# Patient Record
Sex: MALE | Race: WHITE | Employment: FULL TIME | URBAN - METROPOLITAN AREA
[De-identification: names, ages, dates, MRNs, and addresses within clinical notes are randomized per-mention and may not be internally consistent; named-entity substitution may affect disease eponyms.]

---

## 2022-12-16 ENCOUNTER — HOSPITAL ENCOUNTER (EMERGENCY)
Age: 22
Discharge: HOME | End: 2022-12-16
Attending: EMERGENCY MEDICINE
Payer: COMMERCIAL

## 2022-12-16 VITALS
TEMPERATURE: 98.7 F | DIASTOLIC BLOOD PRESSURE: 89 MMHG | RESPIRATION RATE: 16 BRPM | HEART RATE: 51 BPM | WEIGHT: 190 LBS | SYSTOLIC BLOOD PRESSURE: 106 MMHG | BODY MASS INDEX: 25.73 KG/M2 | OXYGEN SATURATION: 99 % | HEIGHT: 72 IN

## 2022-12-16 DIAGNOSIS — R19.7 DIARRHEA, UNSPECIFIED TYPE: Primary | ICD-10-CM

## 2022-12-16 DIAGNOSIS — R10.13 ABDOMINAL PAIN, EPIGASTRIC: ICD-10-CM

## 2022-12-16 LAB
ALBUMIN SERPL-MCNC: 4.2 GM/DL (ref 3.4–5)
ALP BLD-CCNC: 71 IU/L (ref 40–129)
ALT SERPL-CCNC: 20 U/L (ref 10–40)
ANION GAP SERPL CALCULATED.3IONS-SCNC: 11 MMOL/L (ref 4–16)
AST SERPL-CCNC: 19 IU/L (ref 15–37)
BASOPHILS ABSOLUTE: 0 K/CU MM
BASOPHILS RELATIVE PERCENT: 0.4 % (ref 0–1)
BILIRUB SERPL-MCNC: 0.4 MG/DL (ref 0–1)
BILIRUBIN URINE: NEGATIVE MG/DL
BLOOD, URINE: NEGATIVE
BUN BLDV-MCNC: 12 MG/DL (ref 6–23)
CALCIUM SERPL-MCNC: 9.5 MG/DL (ref 8.3–10.6)
CHLORIDE BLD-SCNC: 104 MMOL/L (ref 99–110)
CLARITY: CLEAR
CO2: 26 MMOL/L (ref 21–32)
COLOR: YELLOW
COMMENT UA: NORMAL
CREAT SERPL-MCNC: 0.9 MG/DL (ref 0.9–1.3)
DIFFERENTIAL TYPE: ABNORMAL
EOSINOPHILS ABSOLUTE: 0.3 K/CU MM
EOSINOPHILS RELATIVE PERCENT: 4.3 % (ref 0–3)
GFR SERPL CREATININE-BSD FRML MDRD: >60 ML/MIN/1.73M2
GLUCOSE BLD-MCNC: 85 MG/DL (ref 70–99)
GLUCOSE, URINE: NEGATIVE MG/DL
HCT VFR BLD CALC: 45.1 % (ref 42–52)
HEMOGLOBIN: 15.5 GM/DL (ref 13.5–18)
IMMATURE NEUTROPHIL %: 0.4 % (ref 0–0.43)
KETONES, URINE: NEGATIVE MG/DL
LEUKOCYTE ESTERASE, URINE: NEGATIVE
LIPASE: 67 IU/L (ref 13–60)
LYMPHOCYTES ABSOLUTE: 2.5 K/CU MM
LYMPHOCYTES RELATIVE PERCENT: 32.5 % (ref 24–44)
MCH RBC QN AUTO: 30.6 PG (ref 27–31)
MCHC RBC AUTO-ENTMCNC: 34.4 % (ref 32–36)
MCV RBC AUTO: 89 FL (ref 78–100)
MONOCYTES ABSOLUTE: 0.5 K/CU MM
MONOCYTES RELATIVE PERCENT: 6.6 % (ref 0–4)
NITRITE URINE, QUANTITATIVE: NEGATIVE
PDW BLD-RTO: 13 % (ref 11.7–14.9)
PH, URINE: 5 (ref 5–8)
PLATELET # BLD: 177 K/CU MM (ref 140–440)
PMV BLD AUTO: 10.5 FL (ref 7.5–11.1)
POTASSIUM SERPL-SCNC: 4 MMOL/L (ref 3.5–5.1)
PROTEIN UA: NEGATIVE MG/DL
RBC # BLD: 5.07 M/CU MM (ref 4.6–6.2)
SEGMENTED NEUTROPHILS ABSOLUTE COUNT: 4.3 K/CU MM
SEGMENTED NEUTROPHILS RELATIVE PERCENT: 55.8 % (ref 36–66)
SODIUM BLD-SCNC: 141 MMOL/L (ref 135–145)
SPECIFIC GRAVITY UA: 1.02 (ref 1–1.03)
TOTAL IMMATURE NEUTOROPHIL: 0.03 K/CU MM
TOTAL PROTEIN: 7.2 GM/DL (ref 6.4–8.2)
UROBILINOGEN, URINE: 0.2 MG/DL (ref 0.2–1)
WBC # BLD: 7.7 K/CU MM (ref 4–10.5)

## 2022-12-16 PROCEDURE — 80053 COMPREHEN METABOLIC PANEL: CPT

## 2022-12-16 PROCEDURE — 83690 ASSAY OF LIPASE: CPT

## 2022-12-16 PROCEDURE — 2580000003 HC RX 258: Performed by: EMERGENCY MEDICINE

## 2022-12-16 PROCEDURE — 6360000002 HC RX W HCPCS: Performed by: EMERGENCY MEDICINE

## 2022-12-16 PROCEDURE — 96374 THER/PROPH/DIAG INJ IV PUSH: CPT

## 2022-12-16 PROCEDURE — 81003 URINALYSIS AUTO W/O SCOPE: CPT

## 2022-12-16 PROCEDURE — 99284 EMERGENCY DEPT VISIT MOD MDM: CPT

## 2022-12-16 PROCEDURE — 85025 COMPLETE CBC W/AUTO DIFF WBC: CPT

## 2022-12-16 RX ORDER — ONDANSETRON 2 MG/ML
4 INJECTION INTRAMUSCULAR; INTRAVENOUS EVERY 6 HOURS PRN
Status: DISCONTINUED | OUTPATIENT
Start: 2022-12-16 | End: 2022-12-16 | Stop reason: HOSPADM

## 2022-12-16 RX ORDER — PANTOPRAZOLE SODIUM 40 MG/1
40 TABLET, DELAYED RELEASE ORAL
Qty: 30 TABLET | Refills: 0 | Status: SHIPPED | OUTPATIENT
Start: 2022-12-16

## 2022-12-16 RX ORDER — 0.9 % SODIUM CHLORIDE 0.9 %
1000 INTRAVENOUS SOLUTION INTRAVENOUS ONCE
Status: COMPLETED | OUTPATIENT
Start: 2022-12-16 | End: 2022-12-16

## 2022-12-16 RX ORDER — ONDANSETRON 4 MG/1
4 TABLET, ORALLY DISINTEGRATING ORAL 3 TIMES DAILY PRN
Qty: 21 TABLET | Refills: 0 | Status: SHIPPED | OUTPATIENT
Start: 2022-12-16

## 2022-12-16 RX ADMIN — ONDANSETRON 4 MG: 2 INJECTION INTRAMUSCULAR; INTRAVENOUS at 17:28

## 2022-12-16 RX ADMIN — SODIUM CHLORIDE 1000 ML: 9 INJECTION, SOLUTION INTRAVENOUS at 17:28

## 2022-12-16 ASSESSMENT — PAIN - FUNCTIONAL ASSESSMENT: PAIN_FUNCTIONAL_ASSESSMENT: 0-10

## 2022-12-16 ASSESSMENT — PAIN DESCRIPTION - PAIN TYPE: TYPE: CHRONIC PAIN

## 2022-12-16 ASSESSMENT — PAIN SCALES - GENERAL: PAINLEVEL_OUTOF10: 5

## 2022-12-16 ASSESSMENT — PAIN DESCRIPTION - LOCATION: LOCATION: ABDOMEN

## 2022-12-16 NOTE — ED NOTES
Patient discharged with 2 new rx. Patient verbalized understanding of discharge instructions and follow up care.       Carline Jung RN  12/16/22 3306

## 2022-12-16 NOTE — DISCHARGE INSTRUCTIONS
Return immediately to the emergency department if you experience new or worsened symptoms, new or worsened abdominal pain, inability stay hydrated, blood or dark black in your stool, or for any other concerns.

## 2022-12-16 NOTE — ED PROVIDER NOTES
Emergency Department Encounter    Patient: Dhaval De La Garza  MRN: [id number]  : 2000  Date of Evaluation: 2022  ED Provider:  Alex Peres MD    Triage Chief Complaint:   Abdominal Pain, Nausea, and Diarrhea    [place]:  Dhaval De La Garza is a 20 y.o. male that presents complaining of diarrhea which she has experienced since 2022. No hematochezia or melena. Patient states that the month prior to his diarrhea, he experienced constipation. Patient has no history of IBS or IBD. Patient does have associated nausea and vomiting. No hematemesis or coffee-ground emesis. No bilious emesis. No fevers. No urinary frequency, urgency, dysuria, hematuria. Patient does endorse some epigastric abdominal discomfort. No alcohol use. No worsening of his pain with eating. Symptoms of been constant since onset. Mild to moderate symptoms. No radiation. No known alleviating or exacerbating factors. ROS - see HPI, below listed is current ROS at time of my eval:  General:  No fevers, no chills  Eyes:   no discharge  ENT:  No sore throat, no nasal congestion  Cardiovascular:  No chest pain  Respiratory:  No shortness of breath, no cough  Gastrointestinal:  + pain,  nausea,  vomiting, and diarrhea  Musculoskeletal:  No muscle pain, no joint pain  Skin:  No rash, no pruritis  Neurologic:   no headache  Genitourinary:  No dysuria  Endocrine:  No unexpected weight gain, no unexpected weight loss  Extremities:  no edema, no pain    No past medical history on file. No past surgical history on file. No family history on file.   Social History     Socioeconomic History    Marital status: Single     Spouse name: Not on file    Number of children: Not on file    Years of education: Not on file    Highest education level: Not on file   Occupational History    Not on file   Tobacco Use    Smoking status: Some Days     Types: Cigarettes    Smokeless tobacco: Never   Vaping Use    Vaping Use: Some days Substance and Sexual Activity    Alcohol use: Never    Drug use: Yes     Types: Marijuana [place])    Sexual activity: Not on file   Other Topics Concern    Not on file   Social History Narrative    Not on file     Social Determinants of Health     Financial Resource Strain: Not on file   Food Insecurity: Not on file   Transportation Needs: Not on file   Physical Activity: Not on file   Stress: Not on file   Social Connections: Not on file   Intimate Partner Violence: Not on file   Housing Stability: Not on file     No current facility-administered medications for this encounter. Current Outpatient Medications   Medication Sig Dispense Refill    ondansetron (ZOFRAN-ODT) 4 MG disintegrating tablet Take 1 tablet by mouth 3 times daily as needed for Nausea or Vomiting 21 tablet 0    pantoprazole (PROTONIX) 40 MG tablet Take 1 tablet by mouth every morning (before breakfast) 30 tablet 0     No Known Allergies    Nursing Notes Reviewed    Physical Exam:  Triage VS:    ED Triage Vitals [12/16/22 9852]   Enc Vitals Group      /83      Heart Rate 77      Resp 18      Temp 98.7 °F (37.1 °C)      Temp Source Infrared      SpO2 97 %      Weight 190 lb (86.2 kg)      Height 6' (1.829 m)      Head Circumference       Peak Flow       Pain Score       Pain Loc       Pain Edu? Excl. in [address]? My pulse ox interpretation is - normal    General appearance:  No acute distress. Skin:  Warm. Dry. Eye:  Extraocular movements intact. Ears, nose, mouth and throat:  Oral mucosa moist   Neck:  Trachea midline. Extremity:   Normal ROM     Heart:  Regular rate and rhythm with no murmurs rubs or gallops  Perfusion:  intact  Respiratory: Respirations nonlabored. No wheezes rhonchi or rales. Abdominal:  Normal bowel sounds. Soft. Mild epigastric and left upper quadrant tenderness to palpation. No rebound or guarding. Non distended.   Back:  No CVA tenderness to palpation             Neurological:  Alert and oriented times 3.     I have reviewed and interpreted all of the currently available lab results from this visit (if applicable):  Results for orders placed or performed during the hospital encounter of 12/16/22   CBC with Auto Differential   Result Value Ref Range    WBC 7.7 4.0 - 10.5 K/CU MM    RBC 5.07 4.6 - 6.2 M/CU MM    Hemoglobin 15.5 13.5 - 18.0 GM/DL    Hematocrit 45.1 42 - 52 %    MCV 89.0 78 - 100 FL    MCH 30.6 27 - 31 PG    MCHC 34.4 32.0 - 36.0 %    RDW 13.0 11.7 - 14.9 %    Platelets 362 836 - 410 K/CU MM    MPV 10.5 7.5 - 11.1 FL    Differential Type AUTOMATED DIFFERENTIAL     Segs Relative 55.8 36 - 66 %    Lymphocytes % 32.5 24 - 44 %    Monocytes % 6.6 (H) 0 - 4 %    Eosinophils % 4.3 (H) 0 - 3 %    Basophils % 0.4 0 - 1 %    Segs Absolute 4.3 K/CU MM    Lymphocytes Absolute 2.5 K/CU MM    Monocytes Absolute 0.5 K/CU MM    Eosinophils Absolute 0.3 K/CU MM    Basophils Absolute 0.0 K/CU MM    Immature Neutrophil % 0.4 0 - 0.43 %    Total Immature Neutrophil 0.03 K/CU MM   Comprehensive Metabolic Panel   Result Value Ref Range    Sodium 141 135 - 145 MMOL/L    Potassium 4.0 3.5 - 5.1 MMOL/L    Chloride 104 99 - 110 mMol/L    CO2 26 21 - 32 MMOL/L    BUN 12 6 - 23 MG/DL    Creatinine 0.9 0.9 - 1.3 MG/DL    Est, Glom Filt Rate >60 >60 mL/min/1.73m2    Glucose 85 70 - 99 MG/DL    Calcium 9.5 8.3 - 10.6 MG/DL    Albumin 4.2 3.4 - 5.0 GM/DL    Total Protein 7.2 6.4 - 8.2 GM/DL    Total Bilirubin 0.4 0.0 - 1.0 MG/DL    ALT 20 10 - 40 U/L    AST 19 15 - 37 IU/L    Alkaline Phosphatase 71 40 - 129 IU/L    Anion Gap 11 4 - 16   Lipase   Result Value Ref Range    Lipase 67 (H) 13 - 60 IU/L   Urinalysis   Result Value Ref Range    Color, UA YELLOW YELLOW    Clarity, UA CLEAR CLEAR    Glucose, Urine NEGATIVE NEGATIVE MG/DL    Bilirubin Urine NEGATIVE NEGATIVE MG/DL    Ketones, Urine NEGATIVE NEGATIVE MG/DL    Specific Gravity, UA 1.025 1.001 - 1.035    Blood, Urine NEGATIVE NEGATIVE    pH, Urine 5.0 5.0 - 8.0 Protein, UA NEGATIVE NEGATIVE MG/DL    Urobilinogen, Urine 0.2 0.2 - 1.0 MG/DL    Nitrite Urine, Quantitative NEGATIVE NEGATIVE    Leukocyte Esterase, Urine NEGATIVE NEGATIVE    Urinalysis Comments       Microscopic exam not performed based on chemical results unless requested in original order. MDM:  Patient presented with nausea vomiting and diarrhea. Abdominal exam nonsurgical and nonfocal.  Patient is afebrile and nontoxic-appearing. At this point presentation appears most consistent with a gastrointestinal illness versus another process early in its course. No evidence of urinary tract infection. LFTs and lipase do not suggest evidence of acute hepatic, biliary, pancreatic disease. No significant electrolyte abnormalities. Patient has no leukocytosis or anemia. Patient will be treated with medications as below. Patient will be provided GI follow-up. I do not have a definitive cause for the patient's symptoms, no indication for further workup at this time based on patient's history, physical exam, and presentation. I did discuss the possibility of a underlying problem such as, but not limited to, appendicitis which may initially not be evident and then become more evident in the future. I do believe that the patient can be treated symptomatically as an outpatient at this time. Patient will be discharged home, he/she was instructed on treatment, monitoring and outpatient followup. The patient was told that if he/she cannot follow up as an outpatient in the discussed time period, they are to return to the ED for reevaluation. Patient understands and agrees with the plan, return warnings given. Clinical Impression:  1. Diarrhea, unspecified type    2.  Abdominal pain, epigastric      Disposition referral (if applicable):  Perez Raman MD  [address]  [phone]    In 2 days      Brent George MD  [address] [facility] [address]   [phone]    Call in 2 days      Disposition medications (if applicable):  Discharge Medication List as of 12/16/2022  6:50 PM        START taking these medications    Details   ondansetron (ZOFRAN-ODT) 4 MG disintegrating tablet Take 1 tablet by mouth 3 times daily as needed for Nausea or Vomiting, Disp-21 tablet, R-0Normal      pantoprazole (PROTONIX) 40 MG tablet Take 1 tablet by mouth every morning (before breakfast), Disp-30 tablet, R-0Normal           ED Provider Disposition Time  DISPOSITION Decision To Discharge 12/16/2022 06:30:20 PM      Comment: Please note this report has been produced using speech recognition software and may contain errors related to that system including errors in grammar, punctuation, and spelling, as well as words and phrases that may be inappropriate. Efforts were made to edit the dictations.        Roque Heath MD  12/17/22 0180

## 2023-11-25 ENCOUNTER — HOSPITAL ENCOUNTER (EMERGENCY)
Age: 23
Discharge: HOME | End: 2023-11-25
Attending: EMERGENCY MEDICINE

## 2023-11-25 VITALS
SYSTOLIC BLOOD PRESSURE: 132 MMHG | DIASTOLIC BLOOD PRESSURE: 77 MMHG | OXYGEN SATURATION: 97 % | TEMPERATURE: 96.9 F | HEART RATE: 89 BPM | RESPIRATION RATE: 16 BRPM | BODY MASS INDEX: 25.06 KG/M2 | HEIGHT: 72 IN | WEIGHT: 185 LBS

## 2023-11-25 DIAGNOSIS — J06.9 VIRAL URI WITH COUGH: Primary | ICD-10-CM

## 2023-11-25 PROCEDURE — 99283 EMERGENCY DEPT VISIT LOW MDM: CPT

## 2023-11-25 RX ORDER — BENZONATATE 100 MG/1
100 CAPSULE ORAL 3 TIMES DAILY PRN
Qty: 10 CAPSULE | Refills: 0 | Status: SHIPPED | OUTPATIENT
Start: 2023-11-25 | End: 2023-12-02

## 2023-11-25 RX ORDER — METHOCARBAMOL 500 MG/1
1000 TABLET, FILM COATED ORAL 4 TIMES DAILY
Qty: 80 TABLET | Refills: 0 | Status: SHIPPED | OUTPATIENT
Start: 2023-11-25 | End: 2023-12-05

## 2023-11-25 RX ORDER — GUAIFENESIN/DEXTROMETHORPHAN 100-10MG/5
5 SYRUP ORAL 4 TIMES DAILY PRN
Qty: 120 ML | Refills: 0 | Status: SHIPPED | OUTPATIENT
Start: 2023-11-25 | End: 2023-12-05

## 2023-11-25 RX ORDER — ALBUTEROL SULFATE 90 UG/1
2 AEROSOL, METERED RESPIRATORY (INHALATION) EVERY 6 HOURS PRN
COMMUNITY
Start: 2018-02-21

## 2023-11-25 ASSESSMENT — PAIN - FUNCTIONAL ASSESSMENT: PAIN_FUNCTIONAL_ASSESSMENT: NONE - DENIES PAIN

## 2023-11-25 NOTE — ED NOTES
Discharge instructions and prescriptions were reviewed and the patient will follow up with the PCP. The patient voiced understanding.           Gudelia Shell RN  11/25/23 4829

## 2023-11-25 NOTE — ED PROVIDER NOTES
Emergency Department Encounter    Patient: Tia Carvajal  MRN: [id number]  : 2000  Date of Evaluation: 2023  ED Provider:  Meagan Nava MD    MDM:    Clinical Impression:  1. Viral URI with cough          Triage Chief Complaint: Cough and Shortness of Breath (Reports of a cough and shortness of breath for 5 days.            )          I completed a structured, evidence-based clinical evaluation to screen for acute emergent condition that poses a threat to life or bodily function. Diagnostic studies/Differential diagnosis included: (with independent interpretations \"as interpreted by me\" and tests considered but not performed) Patient presenting with viral syndrome. At this point symptoms appear most consistent with a viral illness, versus another process early in its course. I estimate there is low risk for, but not limited to, Acute coronary syndrome, pulmonary embolus, aortic dissection, pneumonia requiring admission, sepsis, bacterial meningitis, aortic dissection    In shared decision-making with the patient, given duration of symptoms, patient will not be tested for COVID-19 or influenza. No hypoxia. No tachypnea or increased work of breathing. No evidence of pneumonia. Patient is nontoxic appearing, appears well hydrated. No indication for imaging here. Patient is tolerating oral intake without difficulty. Patient understands that at this time there is no evidence for another underlying process, however that early in the process of any illness or infection an initial workup/presentation can be falsely reassuring/negative. Based on history, physical exam and discussion with patient, the patient will be treated symptomatically and will be discharged home. Patient was instructed on symptomatic treatment, monitoring and outpatient followup. They understand and agree with the plan, return warnings given.   We have discussed the symptoms which are most concerning that necessitate immediate return. Medications ordered in the ED:  ED Medication Orders (From admission, onward)      None              I considered the following social determinants of health in the patient's treatment plan:   Social Determinants of Health     Tobacco Use: High Risk (11/25/2023)    Patient History     Smoking Tobacco Use: Some Days     Smokeless Tobacco Use: Never     Passive Exposure: Not on file   Alcohol Use: Not on file   Financial Resource Strain: Not on file   Food Insecurity: Not on file   Transportation Needs: Not on file   Physical Activity: Not on file   Stress: Not on file   Social Connections: Not on file   Intimate Partner Violence: Not on file   Depression: Not on file   Housing Stability: Not on file   Interpersonal Safety: Not on file   Utilities: Not on file        Patient smokes    PLAN  Disposition: Patient will be discharged with strict return precautions and follow up instructions. Decision To Discharge 11/25/2023 12:26:24 PM     Disposition referral (if applicable): Otilio Hernandez MD  [address] [place] [address]  [phone]    In 2 days        Medications prescribed (if applicable):  Discharge Medication List as of 11/25/2023 12:35 PM        START taking these medications    Details   methocarbamol (ROBAXIN) 500 MG tablet Take 2 tablets by mouth 4 times daily for 10 days, Disp-80 tablet, R-0Normal      guaiFENesin-dextromethorphan (ROBITUSSIN DM) 100-10 MG/5ML syrup Take 5 mLs by mouth 4 times daily as needed for Cough, Disp-120 mL, R-0Normal      benzonatate (TESSALON PERLES) 100 MG capsule Take 1 capsule by mouth 3 times daily as needed for Cough, Disp-10 capsule, R-0Normal                   ===================================================================    HPI/Pertinent ROS:  Trevor Cain is a 24 y.o. male that presents complaint of 5-day history of cough, congestion, sore throat, body aches.   Patient states that he

## 2024-11-12 ENCOUNTER — APPOINTMENT (OUTPATIENT)
Dept: GENERAL RADIOLOGY | Age: 24
End: 2024-11-12

## 2024-11-12 ENCOUNTER — HOSPITAL ENCOUNTER (EMERGENCY)
Age: 24
Discharge: HOME | End: 2024-11-12

## 2024-11-12 VITALS
WEIGHT: 208 LBS | OXYGEN SATURATION: 96 % | TEMPERATURE: 97.9 F | HEART RATE: 76 BPM | BODY MASS INDEX: 28.17 KG/M2 | HEIGHT: 72 IN | RESPIRATION RATE: 18 BRPM

## 2024-11-12 DIAGNOSIS — S61.209A FINGER AVULSION, INITIAL ENCOUNTER: Primary | ICD-10-CM

## 2024-11-12 PROCEDURE — 90715 TDAP VACCINE 7 YRS/> IM: CPT

## 2024-11-12 PROCEDURE — 90471 IMMUNIZATION ADMIN: CPT

## 2024-11-12 PROCEDURE — 6360000002 HC RX W HCPCS

## 2024-11-12 PROCEDURE — 73140 X-RAY EXAM OF FINGER(S): CPT

## 2024-11-12 PROCEDURE — 99284 EMERGENCY DEPT VISIT MOD MDM: CPT

## 2024-11-12 RX ORDER — BACITRACIN ZINC 500 [USP'U]/G
OINTMENT TOPICAL ONCE
Status: COMPLETED | OUTPATIENT
Start: 2024-11-12 | End: 2024-11-12

## 2024-11-12 RX ADMIN — BACITRACIN ZINC 1 EACH: 500 OINTMENT TOPICAL at 13:26

## 2024-11-12 RX ADMIN — TETANUS TOXOID, REDUCED DIPHTHERIA TOXOID AND ACELLULAR PERTUSSIS VACCINE, ADSORBED 0.5 ML: 5; 2.5; 8; 8; 2.5 SUSPENSION INTRAMUSCULAR at 13:26

## 2024-11-12 ASSESSMENT — PAIN DESCRIPTION - ORIENTATION: ORIENTATION: RIGHT

## 2024-11-12 ASSESSMENT — PAIN SCALES - GENERAL: PAINLEVEL_OUTOF10: 4

## 2024-11-12 ASSESSMENT — PAIN - FUNCTIONAL ASSESSMENT
PAIN_FUNCTIONAL_ASSESSMENT: 0-10
PAIN_FUNCTIONAL_ASSESSMENT: 0-10

## 2024-11-12 ASSESSMENT — PAIN DESCRIPTION - LOCATION
LOCATION: FINGER (COMMENT WHICH ONE)
LOCATION: FINGER (COMMENT WHICH ONE)

## 2024-11-12 NOTE — DISCHARGE INSTRUCTIONS
You have a finger avulsion that cannot be closed by his sutures.  Keep wound clean, keep dressings on finger.  Do not submerge hand.   You may wash your hand gently, if it starts to bleed apply pressure.

## 2024-11-12 NOTE — ED NOTES
Patient's wound cleansed and dressed. Discharge instructions reviewed with patient. Wound care discussed. Patient denies further questions and verbalizes understanding

## 2024-11-12 NOTE — ED PROVIDER NOTES
Emergency Department Encounter  Location: [facility]    Patient: Aubrey Zayas  MRN: [id number]  : 2000  Date of evaluation: 2024  ED Provider: Jonas De Leon DO    Chief Complaint:    Laceration (Right thumb with knife)    [place]:  Aubrey Zayas is a 24 y.o. male that presents to the emergency department today with laceration to his left hand.  He is right-handed.  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 He was using a new sharp knife and accidentally cut off the tip of his left thumb.  Has had control of bleeding with applying pressure.         History reviewed. No pertinent past medical history.  History reviewed. No pertinent surgical history.  History reviewed. No pertinent family history.  Social History     Socioeconomic History    Marital status: Single     Spouse name: Not on file    Number of children: Not on file    Years of education: Not on file    Highest education level: Not on file   Occupational History    Not on file   Tobacco Use    Smoking status: Some Days     Types: Cigarettes    Smokeless tobacco: Never   Vaping Use    Vaping status: Some Days    Substances: Nicotine    Passive vaping exposure: Yes   Substance and Sexual Activity    Alcohol use: Never    Drug use: Yes     Types: Marijuana (Weed)    Sexual activity: Not on file   Other Topics Concern    Not on file   Social History Narrative    Not on file     Social Determinants of Health     Financial Resource Strain: Not on file   Food Insecurity: Not on file   Transportation Needs: Not on file   Physical Activity: Not on file   Stress: Not on file   Social Connections: Not on file   Intimate Partner Violence: Not on file   Housing Stability: Not on file     No current facility-administered medications for this encounter.     Current Outpatient Medications   Medication Sig Dispense Refill    albuterol sulfate HFA (PROVENTIL;VENTOLIN;PROAIR) 108 (90 Base) MCG/ACT inhaler Inhale 2 puffs into the lungs

## 2025-02-10 ENCOUNTER — HOSPITAL ENCOUNTER (EMERGENCY)
Age: 25
Discharge: HOME | End: 2025-02-10
Attending: EMERGENCY MEDICINE
Payer: MEDICAID

## 2025-02-10 VITALS
HEIGHT: 72 IN | OXYGEN SATURATION: 98 % | BODY MASS INDEX: 30.48 KG/M2 | TEMPERATURE: 98.1 F | DIASTOLIC BLOOD PRESSURE: 71 MMHG | WEIGHT: 225 LBS | HEART RATE: 88 BPM | SYSTOLIC BLOOD PRESSURE: 126 MMHG | RESPIRATION RATE: 14 BRPM

## 2025-02-10 DIAGNOSIS — R10.13 ABDOMINAL PAIN, EPIGASTRIC: ICD-10-CM

## 2025-02-10 DIAGNOSIS — R10.13 DYSPEPSIA: ICD-10-CM

## 2025-02-10 DIAGNOSIS — R11.2 NAUSEA AND VOMITING, UNSPECIFIED VOMITING TYPE: Primary | ICD-10-CM

## 2025-02-10 PROCEDURE — 6370000000 HC RX 637 (ALT 250 FOR IP): Performed by: EMERGENCY MEDICINE

## 2025-02-10 PROCEDURE — 99283 EMERGENCY DEPT VISIT LOW MDM: CPT

## 2025-02-10 RX ORDER — FAMOTIDINE 20 MG/1
20 TABLET, FILM COATED ORAL ONCE
Status: COMPLETED | OUTPATIENT
Start: 2025-02-10 | End: 2025-02-10

## 2025-02-10 RX ORDER — ONDANSETRON 4 MG/1
8 TABLET, ORALLY DISINTEGRATING ORAL ONCE
Status: COMPLETED | OUTPATIENT
Start: 2025-02-10 | End: 2025-02-10

## 2025-02-10 RX ORDER — ONDANSETRON 4 MG/1
8 TABLET, ORALLY DISINTEGRATING ORAL 3 TIMES DAILY PRN
Qty: 30 TABLET | Refills: 0 | Status: SHIPPED | OUTPATIENT
Start: 2025-02-10

## 2025-02-10 RX ORDER — MAGNESIUM HYDROXIDE/ALUMINUM HYDROXICE/SIMETHICONE 120; 1200; 1200 MG/30ML; MG/30ML; MG/30ML
30 SUSPENSION ORAL ONCE
Status: COMPLETED | OUTPATIENT
Start: 2025-02-10 | End: 2025-02-10

## 2025-02-10 RX ADMIN — ONDANSETRON 8 MG: 4 TABLET, ORALLY DISINTEGRATING ORAL at 21:14

## 2025-02-10 RX ADMIN — FAMOTIDINE 20 MG: 20 TABLET, FILM COATED ORAL at 21:15

## 2025-02-10 RX ADMIN — ALUMINUM HYDROXIDE, MAGNESIUM HYDROXIDE, AND SIMETHICONE 30 ML: 200; 200; 20 SUSPENSION ORAL at 21:15

## 2025-02-10 ASSESSMENT — PAIN - FUNCTIONAL ASSESSMENT
PAIN_FUNCTIONAL_ASSESSMENT: ACTIVITIES ARE NOT PREVENTED
PAIN_FUNCTIONAL_ASSESSMENT: 0-10
PAIN_FUNCTIONAL_ASSESSMENT: 0-10
PAIN_FUNCTIONAL_ASSESSMENT: ACTIVITIES ARE NOT PREVENTED

## 2025-02-10 ASSESSMENT — PAIN SCALES - GENERAL
PAINLEVEL_OUTOF10: 2
PAINLEVEL_OUTOF10: 4

## 2025-02-10 ASSESSMENT — PAIN DESCRIPTION - ONSET: ONSET: PROGRESSIVE

## 2025-02-10 ASSESSMENT — PAIN DESCRIPTION - LOCATION
LOCATION: ABDOMEN
LOCATION: ABDOMEN

## 2025-02-10 ASSESSMENT — PAIN DESCRIPTION - PAIN TYPE: TYPE: ACUTE PAIN

## 2025-02-10 ASSESSMENT — PAIN DESCRIPTION - FREQUENCY: FREQUENCY: INTERMITTENT

## 2025-02-10 ASSESSMENT — LIFESTYLE VARIABLES
HOW MANY STANDARD DRINKS CONTAINING ALCOHOL DO YOU HAVE ON A TYPICAL DAY: 1 OR 2
HOW OFTEN DO YOU HAVE A DRINK CONTAINING ALCOHOL: 2-4 TIMES A MONTH

## 2025-02-10 ASSESSMENT — PAIN DESCRIPTION - ORIENTATION: ORIENTATION: INNER

## 2025-02-10 ASSESSMENT — PAIN DESCRIPTION - DESCRIPTORS: DESCRIPTORS: CRAMPING;BURNING

## 2025-02-11 NOTE — ED NOTES
Patient discharge with instructions and prescriptions x2 w/ work note. Pt verbalized understanding.

## 2025-02-11 NOTE — ED PROVIDER NOTES
exudates, Nose normal.   Eyes: Conjunctiva normal, No discharge. No scleral icterus.  Neck: Normal range of motion, No tenderness, Supple.  Lymphatic: No lymphadenopathy noted.   Cardiovascular: Normal heart rate, Normal rhythm, No murmurs, gallops or rubs.   Thorax & Lungs: Normal breath sounds, No respiratory distress, No wheezing.  Abdomen: Soft, mild tenderness to palpation of epigastrium without rebound or guarding, negative Faria sign,, No masses, No pulsatile masses, No distention, Normal bowel sounds  Skin: Warm, Dry, Pink, No mottling, No erythema, No rash.   Back: No tenderness, No CVA tenderness.   Extremities: No edema, No tenderness, No cyanosis, Normal perfusion, No clubbing.   Musculoskeletal: Good range of motion in all major joints as observed. No major deformities noted.   Neurologic: Alert & oriented x 3, No focal deficits noted.   Psychiatric: Affect normal, Judgment normal, Mood normal.     RADIOLOGY  Labs Reviewed - No data to display  I personally reviewed the images. The radiologist's interpretation reveals:  Last Imaging results   No orders to display       MEDS GIVEN IN ED:  Medications   aluminum & magnesium hydroxide-simethicone (MAALOX) 200-200-20 MG/5ML suspension 30 mL (30 mLs Oral Given 2/10/25 2115)   famotidine (PEPCID) tablet 20 mg (20 mg Oral Given 2/10/25 2115)   ondansetron (ZOFRAN-ODT) disintegrating tablet 8 mg (8 mg Oral Given 2/10/25 2114)     COURSE & MEDICAL DECISION MAKING  This is a 24-year-old male who presents to the emergency department complaints of burning sensation of epigastrium with nausea ongoing over the last 2 to 3 days but exacerbated after an episode of coughing up mucus today.  Initial vital signs are reassuring with evidence of fever or tachycardia.  He is nontoxic-appearing on exam.  He has mild tenderness to palpation of epigastrium without rebound or guarding.  In speaking with the patient CMP does have a history of gastritis previously but does not